# Patient Record
Sex: FEMALE
[De-identification: names, ages, dates, MRNs, and addresses within clinical notes are randomized per-mention and may not be internally consistent; named-entity substitution may affect disease eponyms.]

---

## 2018-11-16 ENCOUNTER — HOSPITAL ENCOUNTER (EMERGENCY)
Dept: HOSPITAL 92 - ERS | Age: 28
Discharge: HOME | End: 2018-11-16
Payer: SELF-PAY

## 2018-11-16 DIAGNOSIS — O03.9: Primary | ICD-10-CM

## 2018-11-16 LAB
ALBUMIN SERPL BCG-MCNC: 4.5 G/DL (ref 3.5–5)
ALP SERPL-CCNC: 61 U/L (ref 40–150)
ALT SERPL W P-5'-P-CCNC: 16 U/L (ref 8–55)
ANION GAP SERPL CALC-SCNC: 11 MMOL/L (ref 10–20)
AST SERPL-CCNC: 18 U/L (ref 5–34)
BASOPHILS # BLD AUTO: 0 THOU/UL (ref 0–0.2)
BASOPHILS NFR BLD AUTO: 0.5 % (ref 0–1)
BILIRUB SERPL-MCNC: 0.9 MG/DL (ref 0.2–1.2)
BUN SERPL-MCNC: 12 MG/DL (ref 7–18.7)
CALCIUM SERPL-MCNC: 9.4 MG/DL (ref 7.8–10.44)
CHLORIDE SERPL-SCNC: 106 MMOL/L (ref 98–107)
CO2 SERPL-SCNC: 24 MMOL/L (ref 22–29)
CREAT CL PREDICTED SERPL C-G-VRATE: 0 ML/MIN (ref 70–130)
CRYSTAL-AUWI FLAG: 0.1 (ref 0–15)
EOSINOPHIL # BLD AUTO: 0.4 THOU/UL (ref 0–0.7)
EOSINOPHIL NFR BLD AUTO: 5 % (ref 0–10)
GLOBULIN SER CALC-MCNC: 3.5 G/DL (ref 2.4–3.5)
GLUCOSE SERPL-MCNC: 85 MG/DL (ref 70–105)
HEV IGM SER QL: 1.7 (ref 0–7.99)
HGB BLD-MCNC: 13.5 G/DL (ref 12–16)
HYALINE CASTS #/AREA URNS LPF: (no result) LPF
LYMPHOCYTES # BLD: 2.6 THOU/UL (ref 1.2–3.4)
LYMPHOCYTES NFR BLD AUTO: 29.7 % (ref 21–51)
MCH RBC QN AUTO: 31 PG (ref 27–31)
MCV RBC AUTO: 91 FL (ref 78–98)
MONOCYTES # BLD AUTO: 0.5 THOU/UL (ref 0.11–0.59)
MONOCYTES NFR BLD AUTO: 5.3 % (ref 0–10)
NEUTROPHILS # BLD AUTO: 5.3 THOU/UL (ref 1.4–6.5)
NEUTROPHILS NFR BLD AUTO: 59.4 % (ref 42–75)
PATHC CAST-AUWI FLAG: 0.14 (ref 0–2.49)
PLATELET # BLD AUTO: 279 THOU/UL (ref 130–400)
POTASSIUM SERPL-SCNC: 3.4 MMOL/L (ref 3.5–5.1)
PROT UR STRIP.AUTO-MCNC: 30 MG/DL
RBC # BLD AUTO: 4.34 MILL/UL (ref 4.2–5.4)
RBC UR QL AUTO: (no result) HPF (ref 0–3)
SODIUM SERPL-SCNC: 138 MMOL/L (ref 136–145)
SP GR UR STRIP: 1 (ref 1–1.04)
SPERM-AUWI FLAG: 0 (ref 0–9.9)
WBC # BLD AUTO: 8.8 THOU/UL (ref 4.8–10.8)
WBC UR QL AUTO: (no result) HPF (ref 0–3)
YEAST-AUWI FLAG: 47.5 (ref 0–25)

## 2018-11-16 PROCEDURE — 80053 COMPREHEN METABOLIC PANEL: CPT

## 2018-11-16 PROCEDURE — 81003 URINALYSIS AUTO W/O SCOPE: CPT

## 2018-11-16 PROCEDURE — 87480 CANDIDA DNA DIR PROBE: CPT

## 2018-11-16 PROCEDURE — 87491 CHLMYD TRACH DNA AMP PROBE: CPT

## 2018-11-16 PROCEDURE — 81015 MICROSCOPIC EXAM OF URINE: CPT

## 2018-11-16 PROCEDURE — 87591 N.GONORRHOEAE DNA AMP PROB: CPT

## 2018-11-16 PROCEDURE — 85025 COMPLETE CBC W/AUTO DIFF WBC: CPT

## 2018-11-16 PROCEDURE — 84702 CHORIONIC GONADOTROPIN TEST: CPT

## 2018-11-16 PROCEDURE — 86901 BLOOD TYPING SEROLOGIC RH(D): CPT

## 2018-11-16 PROCEDURE — 36415 COLL VENOUS BLD VENIPUNCTURE: CPT

## 2018-11-16 PROCEDURE — 87660 TRICHOMONAS VAGIN DIR PROBE: CPT

## 2018-11-16 PROCEDURE — 86900 BLOOD TYPING SEROLOGIC ABO: CPT

## 2018-11-16 PROCEDURE — 76856 US EXAM PELVIC COMPLETE: CPT

## 2018-11-16 PROCEDURE — 87510 GARDNER VAG DNA DIR PROBE: CPT

## 2018-11-16 NOTE — ULT
PELVIC ULTRASOUND WITH DOPPLER:

(Transabdominal, transvaginal, Gray scale, color flow, spectral Doppler)

 

Date:  11/16/18 

 

HISTORY:  

Vaginal bleeding, pregnancy. 

 

FINDINGS:

The uterus measures 6.4 x 4.0 x 4.6 cm, without focal mass or endometrial fluid. The endometrium ese
ures 8.0 mm in thickness. No evidence of intrauterine gestational sac is seen. 

 

Right ovary measures 2.4 x 2.8 x 1.2 cm. Left ovary measures 3.6 x 1.9 x 2.4 cm. Flow is demonstrated
 to both ovaries. No adnexal mass is seen. No free fluid is noted in the cul-de-sac. Uterus is retrof
lexed. 

 

IMPRESSION: 

Retroflexed uterus. No evidence of intrauterine pregnancy. Correlation with serial serum beta HCG lev
els and follow-up ultrasound recommended. 

 

 

POS: AUDI

## 2018-11-17 ENCOUNTER — HOSPITAL ENCOUNTER (EMERGENCY)
Dept: HOSPITAL 92 - ERS | Age: 28
Discharge: HOME | End: 2018-11-17
Payer: SELF-PAY

## 2018-11-17 DIAGNOSIS — O03.9: Primary | ICD-10-CM

## 2018-11-17 LAB
ALBUMIN SERPL BCG-MCNC: 4.2 G/DL (ref 3.5–5)
ALP SERPL-CCNC: 55 U/L (ref 40–150)
ALT SERPL W P-5'-P-CCNC: 14 U/L (ref 8–55)
ANION GAP SERPL CALC-SCNC: 10 MMOL/L (ref 10–20)
AST SERPL-CCNC: 14 U/L (ref 5–34)
BASOPHILS # BLD AUTO: 0.1 THOU/UL (ref 0–0.2)
BASOPHILS NFR BLD AUTO: 0.8 % (ref 0–1)
BILIRUB SERPL-MCNC: 0.7 MG/DL (ref 0.2–1.2)
BUN SERPL-MCNC: 8 MG/DL (ref 7–18.7)
CALCIUM SERPL-MCNC: 9 MG/DL (ref 7.8–10.44)
CHLORIDE SERPL-SCNC: 108 MMOL/L (ref 98–107)
CO2 SERPL-SCNC: 24 MMOL/L (ref 22–29)
CREAT CL PREDICTED SERPL C-G-VRATE: 0 ML/MIN (ref 70–130)
EOSINOPHIL # BLD AUTO: 0.4 THOU/UL (ref 0–0.7)
EOSINOPHIL NFR BLD AUTO: 5.5 % (ref 0–10)
GLOBULIN SER CALC-MCNC: 3.1 G/DL (ref 2.4–3.5)
GLUCOSE SERPL-MCNC: 94 MG/DL (ref 70–105)
HGB BLD-MCNC: 13.2 G/DL (ref 12–16)
LYMPHOCYTES # BLD: 3 THOU/UL (ref 1.2–3.4)
LYMPHOCYTES NFR BLD AUTO: 38.2 % (ref 21–51)
MCH RBC QN AUTO: 29.8 PG (ref 27–31)
MCV RBC AUTO: 90.3 FL (ref 78–98)
MONOCYTES # BLD AUTO: 0.6 THOU/UL (ref 0.11–0.59)
MONOCYTES NFR BLD AUTO: 7.1 % (ref 0–10)
NEUTROPHILS # BLD AUTO: 3.8 THOU/UL (ref 1.4–6.5)
NEUTROPHILS NFR BLD AUTO: 48.4 % (ref 42–75)
PLATELET # BLD AUTO: 272 THOU/UL (ref 130–400)
POTASSIUM SERPL-SCNC: 3.9 MMOL/L (ref 3.5–5.1)
RBC # BLD AUTO: 4.41 MILL/UL (ref 4.2–5.4)
SODIUM SERPL-SCNC: 138 MMOL/L (ref 136–145)
WBC # BLD AUTO: 7.8 THOU/UL (ref 4.8–10.8)

## 2018-11-17 PROCEDURE — 80053 COMPREHEN METABOLIC PANEL: CPT

## 2018-11-17 PROCEDURE — 36415 COLL VENOUS BLD VENIPUNCTURE: CPT

## 2018-11-17 PROCEDURE — 84702 CHORIONIC GONADOTROPIN TEST: CPT

## 2018-11-17 PROCEDURE — 99284 EMERGENCY DEPT VISIT MOD MDM: CPT

## 2018-11-17 PROCEDURE — 85025 COMPLETE CBC W/AUTO DIFF WBC: CPT

## 2018-11-17 PROCEDURE — 86900 BLOOD TYPING SEROLOGIC ABO: CPT

## 2018-11-17 PROCEDURE — 86901 BLOOD TYPING SEROLOGIC RH(D): CPT

## 2020-09-08 ENCOUNTER — HOSPITAL ENCOUNTER (OUTPATIENT)
Dept: HOSPITAL 92 - LABSCS | Age: 30
Discharge: HOME | End: 2020-09-08
Attending: FAMILY MEDICINE
Payer: MEDICAID

## 2020-09-08 DIAGNOSIS — Z20.828: Primary | ICD-10-CM

## 2020-09-08 PROCEDURE — U0003 INFECTIOUS AGENT DETECTION BY NUCLEIC ACID (DNA OR RNA); SEVERE ACUTE RESPIRATORY SYNDROME CORONAVIRUS 2 (SARS-COV-2) (CORONAVIRUS DISEASE [COVID-19]), AMPLIFIED PROBE TECHNIQUE, MAKING USE OF HIGH THROUGHPUT TECHNOLOGIES AS DESCRIBED BY CMS-2020-01-R: HCPCS

## 2020-09-08 PROCEDURE — 87635 SARS-COV-2 COVID-19 AMP PRB: CPT

## 2020-09-10 ENCOUNTER — HOSPITAL ENCOUNTER (INPATIENT)
Dept: HOSPITAL 92 - L&D | Age: 30
LOS: 5 days | Discharge: HOME | End: 2020-09-15
Attending: FAMILY MEDICINE | Admitting: FAMILY MEDICINE
Payer: SELF-PAY

## 2020-09-10 VITALS — BODY MASS INDEX: 29.9 KG/M2

## 2020-09-10 DIAGNOSIS — D64.9: ICD-10-CM

## 2020-09-10 DIAGNOSIS — O34.211: Primary | ICD-10-CM

## 2020-09-10 DIAGNOSIS — Z3A.38: ICD-10-CM

## 2020-09-10 LAB
ALBUMIN SERPL BCG-MCNC: 3.3 G/DL (ref 3.5–5)
ALP SERPL-CCNC: 220 U/L (ref 40–110)
ALT SERPL W P-5'-P-CCNC: 9 U/L (ref 8–55)
ANION GAP SERPL CALC-SCNC: 16 MMOL/L (ref 10–20)
AST SERPL-CCNC: 14 U/L (ref 5–34)
BASOPHILS # BLD AUTO: 0.1 THOU/UL (ref 0–0.2)
BASOPHILS NFR BLD AUTO: 0.6 % (ref 0–1)
BILIRUB SERPL-MCNC: 0.5 MG/DL (ref 0.2–1.2)
BUN SERPL-MCNC: 7 MG/DL (ref 7–18.7)
CALCIUM SERPL-MCNC: 8.6 MG/DL (ref 7.8–10.44)
CHLORIDE SERPL-SCNC: 109 MMOL/L (ref 98–107)
CO2 SERPL-SCNC: 17 MMOL/L (ref 22–29)
CREAT CL PREDICTED SERPL C-G-VRATE: 153 ML/MIN (ref 70–130)
EOSINOPHIL # BLD AUTO: 0.6 THOU/UL (ref 0–0.7)
EOSINOPHIL NFR BLD AUTO: 6 % (ref 0–10)
GLOBULIN SER CALC-MCNC: 2.9 G/DL (ref 2.4–3.5)
GLUCOSE SERPL-MCNC: 94 MG/DL (ref 70–105)
HGB BLD-MCNC: 12 G/DL (ref 12–16)
LYMPHOCYTES # BLD: 2.3 THOU/UL (ref 1.2–3.4)
LYMPHOCYTES NFR BLD AUTO: 22.8 % (ref 21–51)
MCH RBC QN AUTO: 31.2 PG (ref 27–31)
MCV RBC AUTO: 89.9 FL (ref 78–98)
MONOCYTES # BLD AUTO: 0.5 THOU/UL (ref 0.11–0.59)
MONOCYTES NFR BLD AUTO: 5 % (ref 0–10)
NEUTROPHILS # BLD AUTO: 6.5 THOU/UL (ref 1.4–6.5)
NEUTROPHILS NFR BLD AUTO: 65.6 % (ref 42–75)
PLATELET # BLD AUTO: 174 THOU/UL (ref 130–400)
POTASSIUM SERPL-SCNC: 3.6 MMOL/L (ref 3.5–5.1)
PROT UR-MCNC: (no result) MG/DL (ref 1–14)
RBC # BLD AUTO: 3.83 MILL/UL (ref 4.2–5.4)
SODIUM SERPL-SCNC: 138 MMOL/L (ref 136–145)
SYPHILIS ANTIBODY INDEX: 0.04 S/CO
WBC # BLD AUTO: 10 THOU/UL (ref 4.8–10.8)

## 2020-09-10 PROCEDURE — 51702 INSERT TEMP BLADDER CATH: CPT

## 2020-09-10 PROCEDURE — 84156 ASSAY OF PROTEIN URINE: CPT

## 2020-09-10 PROCEDURE — 82570 ASSAY OF URINE CREATININE: CPT

## 2020-09-10 PROCEDURE — 36415 COLL VENOUS BLD VENIPUNCTURE: CPT

## 2020-09-10 PROCEDURE — 86780 TREPONEMA PALLIDUM: CPT

## 2020-09-10 PROCEDURE — S0028 INJECTION, FAMOTIDINE, 20 MG: HCPCS

## 2020-09-10 PROCEDURE — 76815 OB US LIMITED FETUS(S): CPT

## 2020-09-10 PROCEDURE — 87340 HEPATITIS B SURFACE AG IA: CPT

## 2020-09-10 PROCEDURE — 86901 BLOOD TYPING SEROLOGIC RH(D): CPT

## 2020-09-10 PROCEDURE — 86850 RBC ANTIBODY SCREEN: CPT

## 2020-09-10 PROCEDURE — 86900 BLOOD TYPING SEROLOGIC ABO: CPT

## 2020-09-10 PROCEDURE — 83735 ASSAY OF MAGNESIUM: CPT

## 2020-09-10 PROCEDURE — 85025 COMPLETE CBC W/AUTO DIFF WBC: CPT

## 2020-09-10 PROCEDURE — 85027 COMPLETE CBC AUTOMATED: CPT

## 2020-09-10 PROCEDURE — 80053 COMPREHEN METABOLIC PANEL: CPT

## 2020-09-10 PROCEDURE — 82805 BLOOD GASES W/O2 SATURATION: CPT

## 2020-09-10 PROCEDURE — 88307 TISSUE EXAM BY PATHOLOGIST: CPT

## 2020-09-10 NOTE — PDOC.FPROB
FMR OB H&P: HPI





- History of Present Illness


Chief Complaint: TOLAC


History of Present Illness: 


29 y/o  at 38.5 wks presents for TOLAC. Pregnancy complicated by cHTN, 

anemia of pregnancy. Compliant with ASA for prevention of pre-eclampsia. 

History of SGA. Has followed with MFM. Last [place] 19% on 30 wk sono. Endorses 

good fetal movement. Denies contractions, vaginal bleeding, LOF, HA, chest pain

, SOB, palpitations, edema, vision changes, abdominal pain. 


Primary Care Physician: 


TISHA Samuel





FMR OB H&P: Current Pregnancy





- Prenatal Care


: 3


Para: 1011


Gestational age: 38.5


Due date: 20


Dating Criteria: LMP confirmed with 9.6 wk sono


Course/Complications: 





cHTN, hx of SGA





- OB Labs


Blood type: A


RH: positive


Antibody Screen: negative


HIV: negative


RPR: negative


HepBsAg: negative


Rubella: immune


Urine drug screen: not done


Gonorrhea: negative


Chlamydia: negative


1 hour gtt: 2 hr gtt 76, 151, 115





FMR OB H&P: History





- Past Medical History


PMH: 





chronic HTN





- OB History


OB History: 





hx pre-eclampsia previous pregnancy, pLTCS for failure of descent/arrest of 

dilation?





- GYN History


GYN History: 





NILM 20








- Surgical History


Sx History: 





pLTCS in 





- Social History


Social History: 





Denies drugs, alcohol, tobacco use.





- Family History


Family History: 





maternal HTN, paternal DM





FMR OB H&P: Medications





- Current


Home Medications: 


 











 Medication  Instructions  Recorded  Confirmed  Type


 


Prenatal Vit No.126/Iron/Folic 1 capsule PO DAILY 09/18/15 12/29/15 History





[Classic Prenatal]    


 


Aspirin 1 tab PO DAILY 09/10/20 09/10/20 History











Allergies/Adverse Reactions: 


 Allergies











Allergy/AdvReac Type Severity Reaction Status Date / Time


 


shrimp Allergy   Verified 09/10/20 20:27














FMR OB H&P: ROS





- Review of Systems


General: denies: fever/chills


Eyes: denies: vision changes, floaters


Cardiovascular: denies: chest pain, palpitation, edema


Respiratory: denies: cough, shortness of breath


Gastrointestinal: denies: abdominal pain, nausea, vomiting


Genitourinary (Female): denies: dysuria, vaginal discharge, vaginal bleeding, 

contractions


Neurologic: denies: headache





FMR OB H&P: Vital Signs





- Maternal


Vital signs: 


 Vital Signs - First Documented











Pulse Resp BP


 


 75   18   141/90 H


 


 09/10/20 20:09  09/10/20 20:09  09/10/20 20:09














FMR OB H&P: Physical Exam





- Physical Exam


General: NAD, awake, alert and oriented


HEENT: normocephalic and atraumatic, MMM, grossly normal vision, grossly normal 

hearing


Heart: RRR, normal S1/S2, no murmurs/rubs/gallops, pulses present, no edema


General: CTAB, no respiratory distress, good air movement


Abdomen: soft, gravid


Musculoskeletal: normal gait and station


Neurological: DTR +2, no clonus


Skin: no rash


Psychiatric: intact recent and remote memory, good judgement and insight, 

normal mood and affect





- Pelvic Exam


SVE: 1/thick/high


Fetal Presentation: cephalic confirmed by sono





FMR OB H&P: Results





- Imaging


Imaging: 


cephalic fetal presentation confirmed with bedside sono





Growth US: EFW 3040g, fetal measurements estimating 36 wk, placenta on maternal 

L, NADER 8, , vertex fetal presentation





FMR OB H&P: A/P


Disposition: 





sIUP TOLAC


- 29 y/o at 38.5 weeks  for TOLAC


- previous pLTCS for iwlliams breech presentation


- discussed risks and benefits of TOLAC including uterine rupture, hemorrhage, 

hysterectomy, and maternal/fetal death


- patient expresses understanding, verbalizes risks of TOLAC and would like to 

proceed


- consent obtained


- SVE 1/thick/high


- will place balloon without pharmacologic augmentation





Hx of Pre-eclampsia, cHTN


- has been on ASA


- monitor BP and symptoms closely





Anemia of pregnancy


- aware


- Hgb 12.0


Discussion: 


Date/Time: 09/10/20 2141











This H&P was discussed with Dr. Baird and Dr. De La Torre who agree with the above 

documentation and plan.








Addendum - Attending





- Attending Attestation


Date/Time: 09/10/20 2208





I personally evaluated the patient and discussed the management with Dr. Key 

and Dr. Baird


I agree with the History, Examination, Assessment and Plan documented above 

with any addition or exceptions noted below.





31 yo  female at 38.5 wks here for IOL 2/2 cHTN with hx of previous 

LTCS. 


Risk for uterine rupture, postpartum hemorrhage, fetal intolerance, failed 

induction, prolonged labor, labor arrest, repeat  discussed. Patient 

was able to repeat risk, benefits, and alternative. Questions answered. Patient 

would like to proceed with balloon induction of labor. Consents signed. 


Labs obtained due to initial borderline BP to rule out superimposed preE. 


Cephalic by bedside sono. Left lateral placenta - not low lying. 


FHT reactive. 


GBS negative. 





Megha

## 2020-09-10 NOTE — ULT
ULTRASOUND OBSTETRICAL LIMITED:



DATE:

9/10/2020



HISTORY:

30-year-old pregnant female with pelvic pain. Evaluate fetal growth.



COMPARISON:

None



FINDINGS:



Fetal number: [id number]

Fetal lie: Vertex

Maternal cervix: Obscured by what appears to be a balloon in the vicinity of the internal os

Placenta: Lateral, maternal left. No previa.

Amniotic fluid volume: NADER = 8cm

Fetal heart rate: 152 bpm



Fetal anatomy not evaluated



Fetal biometry:



Biparietal diameter (BPD): 8.7 cm   35 w    1 d

Head circumference (HC): 32 cm    36  w     0 d

Abdominal circumference (AC): 34.3 cm   38 w    1 d

Femur length (FL): 6.8 cm   34 w     6  d





Average ultrasound age (AUA):  36 w    0 d

Estimated date of delivery (JACKY): 10/8/2020

Estimated fetal weight (EFW): 3040 g +/- 450 g 



IMPRESSION:

1) Live 3rd  trimester intrauterine gestation.

2) Estimated gestational age of  36 weeks,  0 days

3) cephalic lie.

4) cervical balloon



Reported By: Dwain Shelley 

Electronically Signed:  9/10/2020 11:55 PM

## 2020-09-11 LAB — HBSAG INDEX: 0.14 S/CO (ref 0–0.99)

## 2020-09-11 PROCEDURE — 10907ZC DRAINAGE OF AMNIOTIC FLUID, THERAPEUTIC FROM PRODUCTS OF CONCEPTION, VIA NATURAL OR ARTIFICIAL OPENING: ICD-10-PCS | Performed by: FAMILY MEDICINE

## 2020-09-11 PROCEDURE — 3E0P7VZ INTRODUCTION OF HORMONE INTO FEMALE REPRODUCTIVE, VIA NATURAL OR ARTIFICIAL OPENING: ICD-10-PCS | Performed by: FAMILY MEDICINE

## 2020-09-11 PROCEDURE — 10H07YZ INSERTION OF OTHER DEVICE INTO PRODUCTS OF CONCEPTION, VIA NATURAL OR ARTIFICIAL OPENING: ICD-10-PCS | Performed by: FAMILY MEDICINE

## 2020-09-11 RX ADMIN — SODIUM CHLORIDE SCH MLS: 0.9 INJECTION, SOLUTION INTRAVENOUS at 23:53

## 2020-09-11 RX ADMIN — MAGNESIUM SULFATE HEPTAHYDRATE SCH MLS: 40 INJECTION, SOLUTION INTRAVENOUS at 00:55

## 2020-09-11 RX ADMIN — ONDANSETRON PRN MG: 2 INJECTION INTRAMUSCULAR; INTRAVENOUS at 02:58

## 2020-09-11 RX ADMIN — MAGNESIUM SULFATE HEPTAHYDRATE SCH MLS: 40 INJECTION, SOLUTION INTRAVENOUS at 18:32

## 2020-09-11 NOTE — PDOC.LDPN
Labor & Delivery Progress Note





- Subjective


Subjective: comfortable





- Objective


Vital signs reviewed and normal: yes


General: NAD


SVE: 5/50/-2


FHT: category 1


[place] contractions every: q4-6 min


-: 


A/P: 1) IUP@38.5 weeks undergoing TOLAC - no cervical change; contractions not 

adequate; continue to titrate pitocin; currently @13


        2) cHTN with superimposed pre-eclampsia with severe features - BP 

stable and good urine output; continue magnesium.

## 2020-09-11 NOTE — PDOC.LDPN
Labor & Delivery Progress Note





- Objective


Abnormal vital signs: BPs 120-130/70s, one 161/94


General: NAD, resting


SVE: 3/50/-2


Dilation: 3


Effacement: 50%


Station: -2


FHT: category 1


Plan: continue plan of care, pitocin for augmentation


-: 


sIUP TOLAC


- 29 y/o at 38.6 weeks  for TOLAC


- previous pLTCS for williams breech presentation


- balloon removed


- 3/50/-2 @ 1020


- pit started





cHTN with superimposed pre-eclampsia


- labs wnl and no symptoms although multiple severe range pressures


- PRN hydralazine and labetolol for elevated pressures


- BPs 120-130s/70s, one 161/94


- continue to monitor closely


- due for next mag check @ 1145





Anemia of pregnancy


- aware


- Hgb 12.0

## 2020-09-11 NOTE — PDOC.LDPN
Labor & Delivery Progress Note





- Subjective


Subjective: comfortable





- Objective


Vital signs reviewed and normal: yes


General: NAD


SVE: 5/50/-2 BBOW


FHT: category 1


[place] contractions every: q4 min


AROM: clear fluid


IUPC placed: yes


-: 


A/P: 1) IUP@ 38.5 weeks undergoing TOLAC - progressing slowly; continue to 

titrate pitocin; Cat 1 FHTs. Arom's clear fluid and IUPC placed. 


        2) Chronic HTn with superimposed pre-eclampsia- adequate urine output; 

continue magnesium.

## 2020-09-11 NOTE — PDOC.LDPN
Labor & Delivery Progress Note





- Subjective


Subjective: comfortable, vaginal pressure





- Objective


Vital signs reviewed and normal: yes


General: NAD


SVE: 4/50/-2


Dilation: 4


Effacement: 50%


Station: -2


FHT: category 1, variability present


[place] contractions every: 4-5 min


Other exam findings: DTR 2+ b/l, RRR, CTAB. Denies headache, SOB.


Plan: continue plan of care, pitocin for augmentation


-: 


sIUP TOLAC


- 31 y/o at 38.6 weeks  for TOLAC


- previous pLTCS for williams breech presentation


- balloon removed and pit started at 1030


- 4/40/-2 @ 1210, cat 1, contractions 4-5min, pit @ 3





cHTN with superimposed pre-eclampsia


- labs wnl and no symptoms although multiple severe range pressures


- PRN hydralazine and labetolol for elevated pressures


- BPs 120-130s/70s


- continue to monitor closely


- due for next mag check @ 1630





Anemia of pregnancy


- aware


- Hgb 12.0

## 2020-09-11 NOTE — PDOC.LDPN
Labor & Delivery Progress Note





- Subjective


Subjective: comfortable, vaginal pressure





- Objective


Vital signs reviewed and normal: yes


General: NAD


SVE: 4.5/50/-2


Dilation: 4.5


Effacement: 50%


Station: -2


FHT: category 1, variability present


[place] contractions every: 2-7 min


Plan: continue plan of care, pitocin for augmentation


-: 


sIUP TOLAC


- 29 y/o at 38.6 weeks  for TOLAC


- previous pLTCS for williams breech presentation


- 4.5/50/-2 @ 1715, cat 1, contractions 2-7min, pit at 4





cHTN with superimposed pre-eclampsia


- labs wnl and no symptoms although multiple severe range pressures


- PRN hydralazine and labetolol for elevated pressures


- BPs 120s-147/70s-89, reflexes appropriate, 350ml urine output since last check


- continue to monitor closely


- due for next mag check @ 1920





Anemia of pregnancy


- aware


- Hgb 12.0

## 2020-09-11 NOTE — PDOC.LDPN
Labor & Delivery Progress Note





- Subjective


Subjective: comfortable





- Objective


Abnormal vital signs: elevated /97, 163/96


General: NAD, resting


Uterine fundus: non tender


FHT: category 1


-: 





sIUP TOLAC


- 29 y/o at 38.5 weeks  for TOLAC


- previous pLTCS for williams breech presentation


- balloon remains in place





cHTN with superimposed pre-eclampsia


- severe range pressures x 3


- started on magnesium


- PRN hydralazine





Anemia of pregnancy


- aware


- Hgb 12.0





Addendum - Attending





- Attending Attestation


Date/Time: 09/11/20 6498





I personally evaluated the patient and discussed the management with Dr. Key


I agree with the History, Examination, Assessment and Plan documented above 

with any addition or exceptions noted below.





Patient continues to have severe range BP which is an increase from baseline. 

Asymptomatic. Labs WNL. Due to underling cHTN and persistent severe range BP/

HTN emergency requiring treatment, will start mag for seizure ppx due to likely 

early evolution of superimposed preE. 





cHTN with atypical superimposed preE --- trend labs, monitor for symptoms. 





Balloon still in place. 


FHT cat 1. 





Megha

## 2020-09-11 NOTE — PDOC.LDPN
Labor & Delivery Progress Note





- Subjective


Subjective: comfortable, vaginal pressure





- Objective


Abnormal vital signs: 140s/90s, 154/92. urine output 520


General: NAD, resting


Uterine fundus: non tender


SVE: 1/thick/high


FHT: category 1, variability present


[place] contractions every: 3-5 min


Other exam findings: balloon securely in place, unable to remove on check. DTR 2

+ b/l


Plan: continue plan of care


-: 


sIUP TOLAC


- 29 y/o at 38.6 weeks  for TOLAC


- previous pLTCS for williams breech presentation


- balloon in place securely, did not move with check


- next check at 1145





cHTN with superimposed pre-eclampsia


- labs wnl and no symptoms although multiple severe range pressures


- PRN hydralazine and labetolol for elevated pressures


- BPs 140s/90s, one 154/92


- continue to monitor closely


- due for next mag check @ 1145





Anemia of pregnancy


- aware


- Hgb 12.0

## 2020-09-11 NOTE — PDOC.LDPN
Labor & Delivery Progress Note





- Subjective


Subjective: comfortable





- Objective


Abnormal vital signs: 142/88, 169/105, 167/99


General: NAD, resting


SVE: balloon in place


FHT: category 1


[place] contractions every: 3-5 min


Other exam findings: reflexes 2+, heart RRR


-: 





sIUP TOLAC


- 29 y/o at 38.5 weeks  for TOLAC


- previous pLTCS for williams breech presentation


- balloon in place, next check @ 1000





cHTN with superimposed pre-eclampsia


- labs wnl and no symptoms although multiple severe range pressures


- continued to have elevated BP on mag with PRN hydralazine


- switched to PRN labetalol for tx of severe pressures


- so far has only required 20mg labetalol and BP has responded well


- continue to monitor closely


- due for next mag check @ 0745





Anemia of pregnancy


- aware


- Hgb 12.0

## 2020-09-12 LAB
ANALYZER IN CARDIO: (no result)
ANALYZER IN CARDIO: (no result)
BASE EXCESS STD BLDA CALC-SCNC: -4.3 MEQ/L
BASE EXCESS STD BLDV CALC-SCNC: -3.6 MEQ/L
HCO3 BLDA-SCNC: 25.5 MEQ/L (ref 22–28)
HCO3 BLDV-SCNC: 25 MEQ/L (ref 22–28)
PH BLDV: 7.26 [PH] (ref 7.32–7.43)

## 2020-09-12 RX ADMIN — MAGNESIUM SULFATE HEPTAHYDRATE SCH MLS: 40 INJECTION, SOLUTION INTRAVENOUS at 04:03

## 2020-09-12 RX ADMIN — MAGNESIUM SULFATE HEPTAHYDRATE SCH MLS: 40 INJECTION, SOLUTION INTRAVENOUS at 13:37

## 2020-09-12 RX ADMIN — ONDANSETRON PRN MG: 2 INJECTION INTRAMUSCULAR; INTRAVENOUS at 04:55

## 2020-09-12 NOTE — PDOC.LDPN
Labor & Delivery Progress Note





- Subjective


Subjective: painful contractions





- Objective


Vital signs reviewed and normal: yes


General: breathing through contractions


SVE: 5/50/-2


FHT: category 1


[place] contractions every: q5-7 min


-: 


A/P: 1) IUP@ 38.4 weeks undergoing TOLAC - no cervical change and still with 

inadequate contractions. Will stop pitocin for 30 minutes and then restart. 


        2) cHTN with superimposed pre-eclampsia- BP stable and adequate urine 

output. continue magnesium.

## 2020-09-12 NOTE — PDOC.BPN
- Brief Progress Note


Encounter Date: 09/12/20


Encounter Time: 05:15


S: Pt resting, epidural just placed.


O: Two severe range pressures SBP 160s, corrected to 130s after epidural


A: Continues to have inadequate contractions by IUPC monitoring. Good UOP. Two 

severe range pressures, resolved with epidural and without anti-hypertensives. 

Few early decels on FHR monitoring.


P: Will defer SVE at this time due to inadequate ctx. Labetalol PRN for severe 

range pressure. Continue Magnesium. Cat 1 strip.

## 2020-09-12 NOTE — PDOC.LDPN
Labor & Delivery Progress Note





- Subjective


Subjective: comfortable, painful contractions





- Objective


Vital signs reviewed and normal: yes


General: breathing through contractions


SVE: 5/50/-2


FHT: category 1


[place] contractions every: q4-6 min


-: 





A/P: 1) IUP@ 38.4 weeks undergoing TOLAC - ctx still inadequate and no cervical 

change. Continue to titrate pitocin. Discussed with patient that if unable to  

             reach adquate ctx or continues to have no cervical change may need 

to proceed with repeat 


       2) cHTN with superimposed pre-eclampsia with severe features - BP stable

; adequate urine output. Continue magnesium

## 2020-09-12 NOTE — PDOC.BPN
- Brief Progress Note


Encounter Date: 20


 I was present, assisted and supervised a repeat LCT C/S on this 29 yo  

@38.5 weeks for failed TOLAC and failure to progress. Viable female infant in 

vertex presentation. Apgars 4/8.  mL. Mild uterine atony treated with 

pitocin, bimanual massage and hemobate 250 mcg IM x 1. Residents: [place]

## 2020-09-12 NOTE — PDOC.BPN
- Brief Progress Note


Encounter Date: 20


Encounter Time: 14:55


Post  Check/Magnesium Check





S: Pt reports that she is doing well, but is very sleepy. She reports an 

episode of nausea with vomitting. The nausea resolved with medication. She 

denies HA, change in vision, SOB, chest pain. She reports that she is passing 

gas and that her pain is adequately controlled 





O:


Vitals: /97, HR 80, O2 sat 95% RA


Gen: NAD, resting comfortably in bed


Pulm: CTAB, no respiratory distress


Card: RRR, no significant murmur appreciated


Abd: ATTP, fundus firm


Msk: no edema


Neuro: sleepy, but easy arousable; DTRs 2+ 





A/P





cHTN - superimposed Preeclampsia


- Started on magnesium for severe range pressures 


- urine output >100ml/hr


- BP elevated but non severe


- continue mag for 24hr post partum





s/p rLTCS


- normal post partum lochia


- appropriate pp exam


- pain well controlled

## 2020-09-12 NOTE — OP
DATE OF PROCEDURE:  2020

RESIDENT SURGEON:  Kristyn Vergara MD, PGY-2.

ASSISTANT:  Robert Mayes MD, PGY-1. 

ATTENDING: Dr. Thakur, present and assisted for the entire procedure.



PROCEDURE:  Repeat low-transverse  section with vacuum assisted

extraction. 



PREOPERATIVE DIAGNOSIS:

1. Term intrauterine pregnancy

2. Chronic hypertension with superimposed pre-eclampsia

3. Failed TOLAC, failure to progress past 5 cm



POSTOPERATIVE DIAGNOSIS:

1. Term intrauterine pregnancy, delivered.

2. same as above



ANESTHESIA:  Epidural.



QUANTITATIVE BLOOD LOSS:  705 mL.

COMPLICATIONS:  None.

SPECIMENS:  Three-vessel cord placenta, intact.  Sent for pathology.  Cord 
blood and

cord gases obtained. 

FINDINGS:  A viable female  with Apgars of 4 and 8 at 1 and 5 minutes, 
respectively. No oxygenation during resuscitation needed.

DRAINS:  Schmid to gravity draining transparent yellow urine.



INDICATIONS:  A 30-year-old, G3, P1-0-1-1, at 39.0 weeks gestation with failure 
to

progress past 5 cm, failed trial of labor after . 



PROCEDURE IN DETAIL:  Risks, benefits, and alternatives of the procedure were

explained to the patient, after which she gave her informed consent.  
Preoperative

antibiotics include cefazolin 2 g IV and azithromycin 500 mg IV since the 
patient

had rupture of membranes during induction.  The patient was taken to the 
operating room and placed in supine position with left tilt.

She was prepped and draped in the usual sterile fashion.  A Pfannenstiel 
incision was made just superior to the previous scar with a scalpel.

The incision was carried down to the fascia, which was sharply nicked on the 
rectus

muscles on both sides of midline.  The fascial cut was extended bilaterally with

Dunne scissors. The inferior and superior edges of the cut fascia were elevated 
with Kocher clamps, and the underlying rectus muscles were sharply and bluntly 
dissected free. The rectus muscles were divided digitally and retracted 
manually. 



The peritoneum was entered bluntly and the peritoneum was manually retracted.  A

bladder blade was then placed.  A low transverse uterine incision was made in 
the midline with a scalpel. Uterus was entered sharply with the scalpel.  The

hysterotomy was extended manually.  The infant was

noted to be in the vertex position and required vacuum for assistance in 
extraction

of the head.  Nuchal cord x1 was noted and reduced prior to delivery of the 
rest of

the body.  Birth time noted at 09:48. The cord was immediately clamped and cut.
  A segment of the cord was

clamped for cord gases.  Cord blood was then obtained.  The placenta was 
delivered

spontaneously with fundal pressure and cord traction. 



The uterus was externalized; endometrium was curetted with a dry lap. Bladder 
blade was replaced. The hysterotomy was closed with a #1 Monocryl suture in a 
running locking fashion.

An imbricating layer was sewn with #1 Monocryl suture in a horizontal nonlocking

fashion.  There was noted to be about a 2 cm fibroid, just proximal to the

hysterotomy incision to the right of the midline.  After closure of the 
hysterotomy,

a 3-0 Vicryl figure-of-eight stitch was used to achieve hemostasis.  The uterus 
was

noted to be quite boggy during the procedure and so excessive bimanual massage 
was performed.

 Hemabate was given to the patient along with the Pitocin. Abdomen was then 
irrigated and suctioned free of clots. The uterus was internalized, and 
hemostasis of hysterotomy was again noted.



The rectus muscle on the patient's left was reapproximated with a 2-0 chromic

suture.  The fascia was then closed with a 3-0 Vicryl suture in a running 
nonlocking

fashion with the exception of locking the first stitch at the corners. The 
subcutaneous space was irrigated. Bleeders were cauterized. Three subcutaneous

interrupted sutures were placed in the subcutaneous space with 3-0 plain gut.  
The

skin was approximated using a 4-0 Monocryl suture in the standard subcutaneous 
running fashion. Dressing was placed. All counts were correct.The patient 
tolerated the procedure

well and went to recover on Labor and Delivery in stable condition. She is to 
remain on magnesium for 24 hours status post delivery. 





Job ID:  [id number]



[facility]

## 2020-09-12 NOTE — PDOC.LDPN
Labor & Delivery Progress Note





- Subjective


Subjective: comfortable





- Objective


Vital signs reviewed and normal: yes


General: NAD


SVE: 5/50/-2 


FHT: category 1


[place] contractions every: q4-7 min


-: 


A/P: 1) IUP@38.4 weeks undergoing TOLAC- no cervical change x 12 hours since 

ROM and placement of IUPC, unable to achieve adequate contractions with 

pitocin. Discussed situation with patient and recommendation to proceed with C-

section. Questions answered. Plan to proceed with repeat  for failure 

to progress 


 2) cHTN with superimposed pre-eclampsia - BP stable and good urine output; 

continue magnesium.

## 2020-09-12 NOTE — PDOC.BPN
- Brief Progress Note





Post  Check/Magnesium Check





S: Pt reports that she is doing well. She denies HA, change in vision, SOB, 

chest pain. 





O:


Vitals:  max, three pressures in 140s, currently /63, HR 80, O2 

sat 98%


Gen: NAD, resting comfortably in bed


Pulm: CTAB, no respiratory distress


Card: RRR, no significant murmur appreciated


Abd: ATTP, fundus firm


Msk: no edema


Neuro: awake, alert,  DTRs 2+ 





A/P





cHTN - superimposed Preeclampsia


- Started on magnesium for severe range pressures 


- urine output >100ml/hr


- BP elevated but non severe


- continue mag for 24hr post partum





s/p rLTCS


- normal post partum lochia


- appropriate pp exam


- pain well controlled

## 2020-09-13 LAB
HGB BLD-MCNC: 9.5 G/DL (ref 12–16)
MCH RBC QN AUTO: 31.6 PG (ref 27–31)
MCV RBC AUTO: 92.1 FL (ref 78–98)
PLATELET # BLD AUTO: 143 THOU/UL (ref 130–400)
RBC # BLD AUTO: 3 MILL/UL (ref 4.2–5.4)
WBC # BLD AUTO: 10 THOU/UL (ref 4.8–10.8)

## 2020-09-13 RX ADMIN — HYDROCODONE BITARTRATE AND ACETAMINOPHEN PRN TAB: 5; 325 TABLET ORAL at 19:51

## 2020-09-13 RX ADMIN — HYDROCODONE BITARTRATE AND ACETAMINOPHEN PRN TAB: 5; 325 TABLET ORAL at 09:37

## 2020-09-13 RX ADMIN — SODIUM CHLORIDE SCH: 0.9 INJECTION, SOLUTION INTRAVENOUS at 14:18

## 2020-09-13 NOTE — PDOC.BPN
- Brief Progress Note





Post  Check/Magnesium Check





S: Pt breastfeeding at bedside. Pt reports that she is doing well. She denies HA

, change in vision, SOB, chest pain. 





O:


Vitals:  max, currently /89, HR 90, O2 sat 98%


Gen: NAD, resting comfortably in bed


Pulm: CTAB, no respiratory distress


Card: RRR, no significant murmur appreciated


Abd: ATTP, fundus firm


Msk: no edema


Neuro: awake, alert,  DTRs 2+ 





A/P





cHTN - superimposed Preeclampsia


- Started on magnesium for severe range pressures 


- urine output >100ml/hr


- BP elevated but non severe


- continue mag for 24hr post partum





s/p rLTCS


- normal post partum lochia


- appropriate pp exam


- pain well controlled

## 2020-09-13 NOTE — PDOC.BPN
- Brief Progress Note


Encounter Time: 22:15





Post  Check/Magnesium Check





S: Pt breastfeeding at bedside. Pt reports that she is doing well. She denies HA

, change in vision, SOB, chest pain. 





O:


Vitals:  max, one pressures in 140s, currently /55, HR 80, O2 sat 

98%


Gen: NAD, resting comfortably in bed


Pulm: CTAB, no respiratory distress


Card: RRR, no significant murmur appreciated


Abd: ATTP, fundus firm


Msk: no edema


Neuro: awake, alert,  DTRs 2+ 





A/P





cHTN - superimposed Preeclampsia


- Started on magnesium for severe range pressures 


- urine output >100ml/hr


- BP elevated but non severe


- continue mag for 24hr post partum





s/p rLTCS


- normal post partum lochia


- appropriate pp exam


- pain well controlled

## 2020-09-13 NOTE — PDOC.BPN
- Brief Progress Note


Encounter Time: 06:00








Post  Check/Magnesium Check





S: Pt resting at bedside. Pt reports that she is doing well. She denies HA, 

change in vision, SOB, chest pain. 





O:


Vitals:  max, currently /54 HR 86, O2 sat 98%


Gen: NAD, resting comfortably in bed


Pulm: CTAB, no respiratory distress


Card: RRR, no significant murmur appreciated


Abd: ATTP, fundus firm


Msk: no edema


Neuro: awake, alert,  DTRs 2+ 





A/P





cHTN - superimposed Preeclampsia


- Started on magnesium for severe range pressures 


- urine output >100ml/hr


- BP elevated but non severe


- continue mag for 24hr post partum





s/p rLTCS


- normal post partum lochia


- appropriate pp exam


- pain well controlled

## 2020-09-13 NOTE — PDOC.PP
Post Partum Progress Note


Post Partum Day #: 1


Subjective: 


Mom is doing well this morning. Her pain is appropriate and lochia is minimal.


PO intake tolerated: yes (on Mg - liquids only)


Flatus: no


Ambulation: yes


 Weight











Weight                         69.4 kg











BPs - 110s/50s


UOP - 1250mL





- Physical Examination


General: NAD


Cardiovascular: no m/r/g, RRR


Respiratory: clear to auscultation bilaterally, non-labored breathing


Abdominal: + bowel sounds, lochia, no distention, appropriately TTP


Skin: CS incision dry & intact, no rash


Neurological: no gross focal deficits


Psychiatric: A&Ox3, normal affect


Result Diagrams: 


 09/13/20 07:58





 09/10/20 22:09


Additional Labs: 


 Post Partum Labs











Blood Type  A POSITIVE   09/10/20  22:09    


 


Hep Bs Antigen  Non-Reactive S/CO (NonReactive)   09/10/20  22:09    








Mg - 5.8





- Assessment/Plan


PPD1 - rLTCS


- appropriately tender, minimal lochia, CS incision clean and dry


- breast feeding and bonding well with baby





cHTN - superimposed Preeclampsia


- Started on magnesium for severe range pressures 


- urine output >100ml/hr


- BPs 110s/50s


- continue mag for 24hr post partum - D/C 1000 on 9/13











Addendum - Attending





- Attending Attestation


Date/Time: 09/13/20 0850





I personally evaluated the patient and discussed the management with Dr. Mayes


I agree with the History, Examination, Assessment and Plan documented above 

with any addition or exceptions noted below- Patient without complaints. 

Afebrile VSS. A/P: 1) POD#1 s/p R LCT C/S - continue current care. 2) cHTN with 

superimposed pre-eclampsia with severe features- BP improved; good urine output

; plan to d/c magnesium @10 AM and plan to transfer to floor.

## 2020-09-14 RX ADMIN — HYDROCODONE BITARTRATE AND ACETAMINOPHEN PRN TAB: 5; 325 TABLET ORAL at 12:39

## 2020-09-15 VITALS — SYSTOLIC BLOOD PRESSURE: 141 MMHG | DIASTOLIC BLOOD PRESSURE: 82 MMHG

## 2020-09-15 VITALS — TEMPERATURE: 98.6 F

## 2020-09-15 NOTE — PDOC.PP
Post Partum Progress Note


Post Partum Day #: 3


Subjective: 


eBttie is doing well this morning. She is eating and ambulating well. She has 

an appointment at [facility] tomorrow.


PO intake tolerated: yes


Flatus: yes


Ambulation: yes


 Vital Signs (12 hours)











  Temp Pulse Resp BP Pulse Ox


 


 09/14/20 20:14  98.3 F  76  16  141/83 H  96








 Weight











Weight                         69.4 kg

















- Physical Examination


General: NAD


Cardiovascular: no m/r/g, RRR


Respiratory: clear to auscultation bilaterally, non-labored breathing


Abdominal: + bowel sounds, lochia, no distention, appropriately TTP


Skin: CS incision dry & intact, no rash


Neurological: no gross focal deficits


Psychiatric: A&Ox3, normal affect


Result Diagrams: 


 09/13/20 07:58





 09/10/20 22:09


Additional Labs: 


 Post Partum Labs











Blood Type  A POSITIVE   09/10/20  22:09    


 


Hep Bs Antigen  Non-Reactive S/CO (NonReactive)   09/10/20  22:09    














- Assessment/Plan


PPD3 - rLTCS


- appropriately tender, minimal lochia, CS incision clean and dry


- breast feeding and bonding well with baby





cHTN - superimposed Preeclampsia


- Received Mg for 24hours post delivery


- BPs 130s/80s, two most recent were 141/83 and 152/92


- Continue to monitor blood pressures





Discharge home today with baby.





Addendum - Attending





- Attending Attestation


Date/Time: 09/15/20 1102





I personally evaluated the patient and discussed the management with Dr. Mayes.


I agree with the History, Examination, Assessment and Plan documented above 

with any addition or exceptions noted below.

## 2024-04-26 NOTE — PDOC.LDPN
Labor & Delivery Progress Note





- Subjective


Subjective: painful contractions (Pt reports 8/10 but appears in NAD)





- Objective


Vital signs reviewed and normal: yes


General: NAD


Uterine fundus: non tender


SVE: 4.5/50/-2


FHT: category 1 (Baseline 130, accells present, had 2 late decels that have 

resolved)


Omro contractions every: 7 min


Plan: continue plan of care


-: 








sIUP TOLAC


- 31 y/o at 38.6 weeks  for TOLAC


- previous pLTCS for williams breech presentation


- Pt required pit break due to late decels, currently pit is at 2 and pt is cat 

1 strip


- 4.5/50/-2 @ 1520, cat 1, contractions 4-5min





cHTN with superimposed pre-eclampsia


- labs wnl and no symptoms although multiple severe range pressures


- PRN hydralazine and labetolol for elevated pressures


- BPs 120s-147/70s-89, reflexes appropriate, 350ml urine output since last check


- continue to monitor closely


- due for next mag check @ 1920





Anemia of pregnancy


- aware


- Hgb 12.0 [phone]